# Patient Record
Sex: FEMALE | Race: OTHER | HISPANIC OR LATINO | URBAN - METROPOLITAN AREA
[De-identification: names, ages, dates, MRNs, and addresses within clinical notes are randomized per-mention and may not be internally consistent; named-entity substitution may affect disease eponyms.]

---

## 2020-11-25 ENCOUNTER — EMERGENCY (EMERGENCY)
Facility: HOSPITAL | Age: 25
LOS: 0 days | Discharge: HOME | End: 2020-11-25
Attending: EMERGENCY MEDICINE | Admitting: EMERGENCY MEDICINE
Payer: MEDICAID

## 2020-11-25 VITALS
DIASTOLIC BLOOD PRESSURE: 49 MMHG | WEIGHT: 233.91 LBS | HEART RATE: 82 BPM | SYSTOLIC BLOOD PRESSURE: 110 MMHG | TEMPERATURE: 98 F | RESPIRATION RATE: 16 BRPM | OXYGEN SATURATION: 98 %

## 2020-11-25 VITALS — HEIGHT: 67 IN | WEIGHT: 235.01 LBS

## 2020-11-25 DIAGNOSIS — F41.9 ANXIETY DISORDER, UNSPECIFIED: ICD-10-CM

## 2020-11-25 DIAGNOSIS — R00.2 PALPITATIONS: ICD-10-CM

## 2020-11-25 DIAGNOSIS — U07.1 COVID-19: ICD-10-CM

## 2020-11-25 PROCEDURE — 99284 EMERGENCY DEPT VISIT MOD MDM: CPT

## 2020-11-25 NOTE — ED PROVIDER NOTE - OBJECTIVE STATEMENT
this is 24yo female presents to ed for evaluation of anxiety. patient states she was diagnosed with covid 8 days ago. patient states that she has been in isolaction for past 8 days and misses her kids. patient had anxiety attack. patient also reports running out of her anxiety medicine. patient denies any chest pain or shortness of breath. patient states she only had body aches for one day

## 2020-11-25 NOTE — ED PROVIDER NOTE - NSFOLLOWUPINSTRUCTIONS_ED_ALL_ED_FT
Anxiety    Generalized anxiety disorder (NHI) is a mental disorder. It is defined as anxiety that is not necessarily related to specific events or activities or is out of proportion to said events. Symptoms include restlessness, fatigue, difficulty concentrations, irritability and difficulty concentrating. It may interfere with life functions, including relationships, work, and school. If you were started on a medication, make sure to take exactly as prescribed and follow up with a psychiatrist.    SEEK IMMEDIATE MEDICAL CARE IF YOU HAVE ANY OF THE FOLLOWING SYMPTOMS: thoughts about hurting killing yourself, thoughts about hurting or killing somebody else, hallucinations, or worsening depression.

## 2020-11-25 NOTE — ED PROVIDER NOTE - NS ED ROS FT
Review of Systems:  	•	CONSTITUTIONAL - no fever, no diaphoresis, no chills  	•	SKIN - no rash  	•	HEMATOLOGIC - no bleeding, no bruising  	•	EYES - no eye pain, no blurry vision  	•	ENT - no change in hearing, no sore throat, no ear pain or tinnitus  	•	RESPIRATORY - no shortness of breath, no cough  	•	CARDIAC - no chest pain, no palpitations  	•	GI - no abd pain, no nausea, no vomiting, no diarrhea, no constipation  	•	GENITO-URINARY - no discharge, no dysuria; no hematuria, no increased urinary frequency  	•	MUSCULOSKELETAL - no joint paint, no swelling, no redness  	•	NEUROLOGIC - no weakness, no headache, no paresthesias, no LOC  	•	PSYCH - positive anxiety, non suicidal, non homicidal, no hallucination, no depression

## 2020-11-25 NOTE — ED ADULT TRIAGE NOTE - CHIEF COMPLAINT QUOTE
Pt brought in by ambulance. "I have been having palpitations for two days." Pt is COVID positive and isolating at home. Pt states she ran out of her Valium two weeks ago.

## 2020-11-25 NOTE — ED PROVIDER NOTE - ATTENDING CONTRIBUTION TO CARE
25yF p/w palpitations - pt is COVID+ on day 8 of isolation (had mild URI sx) and reports 2d of near-continuous palpitations.  No CP or SOB.  No fever or cough.  Has been staying physically active in her bedroom.  No asymmetric leg swelling.  No prior hx palpitations, though pt w/ hx anxiety (intermittent xanax use), which she feels has worsened during isolation since she cannot interact with her family and children.

## 2020-11-25 NOTE — ED ADULT NURSE NOTE - IDEAL BODY WEIGHT(KG)
Vaginal discharge (presumed recurrent bacterial vaginosis):  - start oral antibiotic (clindamycin) - take 1 tablet twice a day with food x 7 days  - THEN resume metronidazole vaginal gel TWICE a week  - start an over the counter probiotic  - drink plenty o
60

## 2020-11-25 NOTE — ED PROVIDER NOTE - CLINICAL SUMMARY MEDICAL DECISION MAKING FREE TEXT BOX
25yF p/w palpitations, covid+ but well appearing, hemodynamically stable and w/o resp distress.  Suspect inc anxiety caused by social isolation contributing to her palpitations.  EKG w/o ischemia or arrhythmia.  Recommend supportive care, o/p f/u if not improving, return precautions.

## 2020-11-25 NOTE — ED PROVIDER NOTE - PATIENT PORTAL LINK FT
You can access the FollowMyHealth Patient Portal offered by [facility] by registering at the following website: [email or web address]. By joining [employer]’s FollowMyHealth portal, you will also be able to view your health information using other applications (apps) compatible with our system.

## 2021-07-22 ENCOUNTER — EMERGENCY (EMERGENCY)
Facility: HOSPITAL | Age: 26
LOS: 0 days | Discharge: HOME | End: 2021-07-22
Attending: EMERGENCY MEDICINE | Admitting: EMERGENCY MEDICINE
Payer: MEDICAID

## 2021-07-22 VITALS
OXYGEN SATURATION: 98 % | HEIGHT: 67 IN | DIASTOLIC BLOOD PRESSURE: 73 MMHG | RESPIRATION RATE: 18 BRPM | HEART RATE: 74 BPM | TEMPERATURE: 98 F | SYSTOLIC BLOOD PRESSURE: 130 MMHG | WEIGHT: 235.01 LBS

## 2021-07-22 DIAGNOSIS — R29.810 FACIAL WEAKNESS: ICD-10-CM

## 2021-07-22 DIAGNOSIS — R51.9 HEADACHE, UNSPECIFIED: ICD-10-CM

## 2021-07-22 DIAGNOSIS — Z86.59 PERSONAL HISTORY OF OTHER MENTAL AND BEHAVIORAL DISORDERS: ICD-10-CM

## 2021-07-22 DIAGNOSIS — G51.0 BELL'S PALSY: ICD-10-CM

## 2021-07-22 PROBLEM — F41.9 ANXIETY DISORDER, UNSPECIFIED: Chronic | Status: ACTIVE | Noted: 2020-11-25

## 2021-07-22 PROCEDURE — 99283 EMERGENCY DEPT VISIT LOW MDM: CPT

## 2021-07-22 RX ORDER — VALACYCLOVIR 500 MG/1
2 TABLET, FILM COATED ORAL
Qty: 42 | Refills: 0
Start: 2021-07-22 | End: 2021-07-28

## 2021-07-22 RX ORDER — DIAZEPAM 5 MG
0 TABLET ORAL
Qty: 0 | Refills: 0 | DISCHARGE

## 2021-07-22 NOTE — ED PROVIDER NOTE - PATIENT PORTAL LINK FT
You can access the FollowMyHealth Patient Portal offered by [facility] by registering at the following website: [email or web address]. By joining [employer]’s FollowMyHealth portal, you will also be able to view your health information using other applications (apps) compatible with our system.

## 2021-07-22 NOTE — ED PROVIDER NOTE - CLINICAL SUMMARY MEDICAL DECISION MAKING FREE TEXT BOX
Patient has a classic presentation of Winthrop Palsy. Other than CN7 involvement, patient has a completely normal neuro exam. Patient has had 2 days of symptoms and will start steroid and Valacyclovir. Patient is a good candidate to attempt outpatient management. Supportive care and home care discussed in detail. Patient aware they may have to return for re-evaluation and possible admission if outpatient treatment fails. Strict return precautions discussed.  I discussed with patient need for possible MRI for further work up for their symptoms and how this was not possible in the Emergency Department at this time.  Follow up being provided to have further evaluation for this test given. Will DC with Neuro follow up. Will also give eye drops and eye care discussed. Chances of full recovery vs chronic symptoms discussed. Patient aware she may benefit from Lyme testing.    Full DC instructions discussed and patient knows when to seek immediate medical attention.  Patient has proper follow up.  All results discussed and patient aware they may require further work up.  Proper follow up ensured. Limitations of ED work up discussed.  Medications administered and prescribed/OTC home meds discussed.  All questions and concerns from patient or family addressed. Understanding of instructions verbalized.

## 2021-07-22 NOTE — ED PROVIDER NOTE - ATTENDING CONTRIBUTION TO CARE
I personally evaluated patient. I agree with the findings and plan with all documentation on chart except as documented  in my note.    Patient has a classic presentation of Vul[place] Palsy. Other than CN7 involvement, patient has a completely normal neuro exam. Patient has had 2 days of symptoms and will start steroid and Valacyclovir. Patient is a good candidate to attempt outpatient management. Supportive care and home care discussed in detail. Patient aware they may have to return for re-evaluation and possible admission if outpatient treatment fails. Strict return precautions discussed.  I discussed with patient need for possible MRI for further work up for their symptoms and how this was not possible in the Emergency Department at this time.  Follow up being provided to have further evaluation for this test given. Will DC with Neuro follow up. Will also give eye drops and eye care discussed. Chances of full recovery vs chronic symptoms discussed. Patient aware she may benefit from Lyme testing.    Full DC instructions discussed and patient knows when to seek immediate medical attention.  Patient has proper follow up.  All results discussed and patient aware they may require further work up.  Proper follow up ensured. Limitations of ED work up discussed.  Medications administered and prescribed/OTC home meds discussed.  All questions and concerns from patient or family addressed. Understanding of instructions verbalized.

## 2021-07-22 NOTE — ED PROVIDER NOTE - PHYSICAL EXAMINATION
GENERAL: Well-nourished, Well-developed. NAD.  HEAD: No visible or palpable bumps or hematomas. No ecchymosis behind ears B/L.  Eyes: (+)inability to fully close R eye. PERRLA, EOMI. No asymmetry. No nystagmus. No conjunctival injection. Non-icteric sclera.  ENMT: MMM.  Neck: Supple. FROM  CVS: RRR. Normal S1,S2. No murmurs appreciated on auscultation   RESP: Lungs clear to auscultation B/L. No wheezing, rales, or rhonchi auscultated.  Skin: Warm, Dry. No rashes or lesions. Good cap refill < 2 sec B/L.  EXT: Radial and pedal pulses present B/L.   Neuro: AA&O x 3. (+)CN 7 palsy. remainder of CN intact. (+)R sided facial droop, no wrinkling of forehead on R side, unable to close R eye completely. Speaking in full sentences. No slurring of speech. No tremors. Sensation grossly intact. Strength 5/5 B/L. Gait within normal limits. Negative Romberg and Pronator Drift. Normal Finger to nose exam. Visual fields intact.

## 2021-07-22 NOTE — ED PROVIDER NOTE - CARE PROVIDERS DIRECT ADDRESSES
[email or web address]

## 2021-07-22 NOTE — ED PROVIDER NOTE - CARE PROVIDER_API CALL
Pee Fernandez  Infectious Diseases  [address] [place] [address]  Phone: [phone]  Fax: [phone]  Follow Up Time: 1-3 Days

## 2021-07-22 NOTE — ED PROVIDER NOTE - OBJECTIVE STATEMENT
25 yo F no pmhx presenting to the ED for evaluation of R sided facial droop, alteration in taste, difficulty closing R eye x 2 days. Pt reports symptoms have been persistent since yesterday without relief, took Excedrin for mild R sided HA yesterday that has resolved. LMP mid June. Denies recent illness. Denies any hx of Lyme disease or tick bite. Denies rash, fever, chills, numbness/tingling to extremities, weakness, nausea, vomiting, neck pain, chest pain, sob.

## 2021-07-22 NOTE — ED PROVIDER NOTE - NSFOLLOWUPINSTRUCTIONS_ED_ALL_ED_FT
**Follow up with your primary care doctor and infectious disease doctor within 1-3 days**    Bell's Palsy    Bell's palsy is a condition in which the muscles on one side of the face become paralyzed. This often causes one side of the face to droop. It is a common condition and most people recover completely. Causes include viral infections but most of the time the reason remains unknown. Signs and symptoms include not being able to raise your eyebrow, not being able to close your eye, drooping of the eyelid and corner of the mouth, sensitivity to loud noises, dryness of the eye, change in taste, and not being able to close your mouth/drooling. Take medicines only as directed by your health care provider. If your eye is affected use moisturizing eye drops to prevent drying of your eye and tape your eyelid shut at night as directed by your health care provider.    SEEK IMMEDIATE MEDICAL CARE IF YOU HAVE THE FOLLOWING SYMPTOMS: weakness or numbness in another part of your body, difficulty swallowing, fever, or neck pain.

## 2021-07-22 NOTE — ED PROVIDER NOTE - NS ED ROS FT
Constitutional: (-) fever (-) malaise (-) diaphoresis (-) chills  Eyes: (-) visual changes (-) eye pain (+)difficulty closing R eye  ENMT: (-) nasal or chest congestion (-) runny nose (-) sore throat (-) hoarseness  (-) hearing changes (-) ear pain (-) ear discharge or infections (-) neck pain (-) neck stiffness  Cardiac: (-) chest pain  (-) palpitations (-) syncope (-) edema  Respiratory: (-) cough (-) SOB (-) AYERS  GI: (-) nausea (-) vomiting (-) diarrhea (-) abdominal pain   : (-) dysuria (-) increased frequency   MS: (-)joint pain  Neuro: (+)R facial droop (-) headache (-) dizziness (-) numbness/tingling to extremities B/L (-) weakness (-) LOC (-) head injury (-) AMS (-) saddle anesthesia (-) tremors  Skin: (-) rash (-) laceration    Except as documented in the HPI, all other systems are negative.

## 2021-12-13 PROBLEM — Z00.00 ENCOUNTER FOR PREVENTIVE HEALTH EXAMINATION: Status: ACTIVE | Noted: 2021-12-13

## 2021-12-17 ENCOUNTER — APPOINTMENT (OUTPATIENT)
Dept: OBGYN | Facility: CLINIC | Age: 26
End: 2021-12-17

## 2024-01-06 ENCOUNTER — TRANSCRIPTION ENCOUNTER (OUTPATIENT)
Age: 29
End: 2024-01-06

## 2024-01-06 ENCOUNTER — EMERGENCY (EMERGENCY)
Facility: HOSPITAL | Age: 29
LOS: 0 days | Discharge: HOME | End: 2024-01-06
Attending: EMERGENCY MEDICINE
Payer: MEDICAID

## 2024-01-06 VITALS
WEIGHT: 253.97 LBS | HEART RATE: 65 BPM | RESPIRATION RATE: 18 BRPM | SYSTOLIC BLOOD PRESSURE: 125 MMHG | TEMPERATURE: 98 F | OXYGEN SATURATION: 100 % | DIASTOLIC BLOOD PRESSURE: 68 MMHG | HEIGHT: 66 IN

## 2024-01-06 DIAGNOSIS — R10.32 LEFT LOWER QUADRANT PAIN: ICD-10-CM

## 2024-01-06 DIAGNOSIS — N93.9 ABNORMAL UTERINE AND VAGINAL BLEEDING, UNSPECIFIED: ICD-10-CM

## 2024-01-06 DIAGNOSIS — Z90.49 ACQUIRED ABSENCE OF OTHER SPECIFIED PARTS OF DIGESTIVE TRACT: ICD-10-CM

## 2024-01-06 DIAGNOSIS — R14.0 ABDOMINAL DISTENSION (GASEOUS): ICD-10-CM

## 2024-01-06 LAB
APPEARANCE UR: ABNORMAL
APPEARANCE UR: ABNORMAL
BACTERIA # UR AUTO: ABNORMAL /HPF
BACTERIA # UR AUTO: ABNORMAL /HPF
BILIRUB UR-MCNC: NEGATIVE — SIGNIFICANT CHANGE UP
BILIRUB UR-MCNC: NEGATIVE — SIGNIFICANT CHANGE UP
CAST: 2 /LPF — SIGNIFICANT CHANGE UP (ref 0–4)
CAST: 2 /LPF — SIGNIFICANT CHANGE UP (ref 0–4)
COLOR SPEC: YELLOW — SIGNIFICANT CHANGE UP
COLOR SPEC: YELLOW — SIGNIFICANT CHANGE UP
DIFF PNL FLD: ABNORMAL
DIFF PNL FLD: ABNORMAL
GLUCOSE UR QL: NEGATIVE MG/DL — SIGNIFICANT CHANGE UP
GLUCOSE UR QL: NEGATIVE MG/DL — SIGNIFICANT CHANGE UP
KETONES UR-MCNC: NEGATIVE MG/DL — SIGNIFICANT CHANGE UP
KETONES UR-MCNC: NEGATIVE MG/DL — SIGNIFICANT CHANGE UP
LEUKOCYTE ESTERASE UR-ACNC: ABNORMAL
LEUKOCYTE ESTERASE UR-ACNC: ABNORMAL
NITRITE UR-MCNC: NEGATIVE — SIGNIFICANT CHANGE UP
NITRITE UR-MCNC: NEGATIVE — SIGNIFICANT CHANGE UP
PH UR: 5.5 — SIGNIFICANT CHANGE UP (ref 5–8)
PH UR: 5.5 — SIGNIFICANT CHANGE UP (ref 5–8)
PROT UR-MCNC: 100 MG/DL
PROT UR-MCNC: 100 MG/DL
RBC CASTS # UR COMP ASSIST: 3 /HPF — SIGNIFICANT CHANGE UP (ref 0–4)
RBC CASTS # UR COMP ASSIST: 3 /HPF — SIGNIFICANT CHANGE UP (ref 0–4)
SP GR SPEC: 1.03 — SIGNIFICANT CHANGE UP (ref 1–1.03)
SP GR SPEC: 1.03 — SIGNIFICANT CHANGE UP (ref 1–1.03)
SQUAMOUS # UR AUTO: 6 /HPF — HIGH (ref 0–5)
SQUAMOUS # UR AUTO: 6 /HPF — HIGH (ref 0–5)
UROBILINOGEN FLD QL: 1 MG/DL — SIGNIFICANT CHANGE UP (ref 0.2–1)
UROBILINOGEN FLD QL: 1 MG/DL — SIGNIFICANT CHANGE UP (ref 0.2–1)
WBC UR QL: 4 /HPF — SIGNIFICANT CHANGE UP (ref 0–5)
WBC UR QL: 4 /HPF — SIGNIFICANT CHANGE UP (ref 0–5)

## 2024-01-06 PROCEDURE — 76830 TRANSVAGINAL US NON-OB: CPT | Mod: 26

## 2024-01-06 PROCEDURE — 81001 URINALYSIS AUTO W/SCOPE: CPT

## 2024-01-06 PROCEDURE — 76830 TRANSVAGINAL US NON-OB: CPT

## 2024-01-06 PROCEDURE — 87086 URINE CULTURE/COLONY COUNT: CPT

## 2024-01-06 PROCEDURE — 99284 EMERGENCY DEPT VISIT MOD MDM: CPT | Mod: 25

## 2024-01-06 PROCEDURE — 99284 EMERGENCY DEPT VISIT MOD MDM: CPT

## 2024-01-06 NOTE — ED PROVIDER NOTE - OBJECTIVE STATEMENT
Patient 28-year-old female no significant past medical history presenting to ED for evaluation of 2 days of left lower quadrant abdominal pain associated with vaginal spotting that has been going on for the last week. Otherwise denies any fever, chills, headache, changes in vision, cough, congestion, cp, palpitations, sob, n/v/d, constipation, urinary complaints, lower extremity pain/swelling.

## 2024-01-06 NOTE — ED PROVIDER NOTE - PATIENT PORTAL LINK FT
You can access the FollowMyHealth Patient Portal offered by [facility] by registering at the following website: [email or web address]. By joining [employer]’s FollowMyHealth portal, you will also be able to view your health information using other applications (apps) compatible with our system. You can access the FollowMyHealth Patient Portal offered by [facility] by registering at the following website: [email or web address]. By joining [employer]’s FollowMyHealth portal, you will also be able to view your health information using other applications (apps) compatible with our system.

## 2024-01-06 NOTE — ED PROVIDER NOTE - NS_EDPROVIDERDISPOUSERTYPE_ED_A_ED
Bladder scan done on pt 499 ml noted on scan,will notify Dr. Gonsales.   Attending Attestation (For Attendings USE Only)...

## 2024-01-06 NOTE — ED ADULT NURSE NOTE - CHIEF COMPLAINT QUOTE
Pt c/o constant sharp pain in LLQ. Pt states that she felt "a balloon-like" feeling when she rolled on to her stomach.

## 2024-01-06 NOTE — ED PROVIDER NOTE - DIFFERENTIAL DIAGNOSIS
Differential Diagnosis uterine fibroids, pregnancy, ectopic pregnancy, rutured ovarian cyst uterine fibroids, pregnancy, ectopic pregnancy, ruptured ovarian cyst, UTI

## 2024-01-06 NOTE — ED PROVIDER NOTE - ATTENDING CONTRIBUTION TO CARE
28-year-old female no past medical history, status postcholecystectomy presents with lower abdominal pain worse on left side since last night feels bloated intermittent dull mild pain.  Patient has had 3 days of vaginal spotting.  LMP was August but states she is irregular, had Depo shot 3 months ago.  No active bleeding or discharge.  No fever.  No nausea vomiting diarrhea constipation.  No dysuria frequency hematuria.  She states pain is resolved now just feels bloated.    On exam, AFVSS, Well appearing, No acute distress, NCAT, EOMI, PERRLA, MMM, Neck supple, LCTAB, RRR nl s1s2 No mrg, Abdomen Soft NTND, CVA tenderness, AAOx3, No Focal Deficits, No LE edema or calf TTP,    A/P; abdominal pain, resolved nontender on exam, pelvic ultrasound unremarkable no cyst torsion, UA negative, urine pregnancy test negative, DC home follow-up with PMD/OB/GYN as outpatient strict return precautions

## 2024-01-06 NOTE — ED ADULT TRIAGE NOTE - CHIEF COMPLAINT QUOTE
Pt c/o constant llq pain x 2 days. Pt c/o constant LLQ pain x 2 days. Pt states that she felt "a balloon-like" feeling when she rolled on to her stomach.

## 2024-01-08 LAB
CULTURE RESULTS: SIGNIFICANT CHANGE UP
CULTURE RESULTS: SIGNIFICANT CHANGE UP
SPECIMEN SOURCE: SIGNIFICANT CHANGE UP
SPECIMEN SOURCE: SIGNIFICANT CHANGE UP

## 2024-02-09 ENCOUNTER — APPOINTMENT (OUTPATIENT)
Dept: OBGYN | Facility: CLINIC | Age: 29
End: 2024-02-09

## 2024-04-16 ENCOUNTER — EMERGENCY (EMERGENCY)
Facility: HOSPITAL | Age: 29
LOS: 0 days | Discharge: HOME | End: 2024-04-16
Attending: EMERGENCY MEDICINE
Payer: MEDICAID

## 2024-04-16 VITALS
HEART RATE: 79 BPM | DIASTOLIC BLOOD PRESSURE: 58 MMHG | RESPIRATION RATE: 18 BRPM | TEMPERATURE: 98 F | OXYGEN SATURATION: 98 % | SYSTOLIC BLOOD PRESSURE: 131 MMHG

## 2024-04-16 DIAGNOSIS — R20.2 PARESTHESIA OF SKIN: ICD-10-CM

## 2024-04-16 DIAGNOSIS — R06.02 SHORTNESS OF BREATH: ICD-10-CM

## 2024-04-16 DIAGNOSIS — F41.9 ANXIETY DISORDER, UNSPECIFIED: ICD-10-CM

## 2024-04-16 DIAGNOSIS — R07.89 OTHER CHEST PAIN: ICD-10-CM

## 2024-04-16 LAB
ALBUMIN SERPL ELPH-MCNC: 4.2 G/DL — SIGNIFICANT CHANGE UP (ref 3.5–5.2)
ALP SERPL-CCNC: 84 U/L — SIGNIFICANT CHANGE UP (ref 30–115)
ALT FLD-CCNC: 29 U/L — SIGNIFICANT CHANGE UP (ref 0–41)
ANION GAP SERPL CALC-SCNC: 14 MMOL/L — SIGNIFICANT CHANGE UP (ref 7–14)
AST SERPL-CCNC: 24 U/L — SIGNIFICANT CHANGE UP (ref 0–41)
BASOPHILS # BLD AUTO: 0.02 K/UL — SIGNIFICANT CHANGE UP (ref 0–0.2)
BASOPHILS NFR BLD AUTO: 0.3 % — SIGNIFICANT CHANGE UP (ref 0–1)
BILIRUB SERPL-MCNC: 0.3 MG/DL — SIGNIFICANT CHANGE UP (ref 0.2–1.2)
BUN SERPL-MCNC: 10 MG/DL — SIGNIFICANT CHANGE UP (ref 10–20)
CALCIUM SERPL-MCNC: 9.2 MG/DL — SIGNIFICANT CHANGE UP (ref 8.4–10.4)
CHLORIDE SERPL-SCNC: 107 MMOL/L — SIGNIFICANT CHANGE UP (ref 98–110)
CO2 SERPL-SCNC: 21 MMOL/L — SIGNIFICANT CHANGE UP (ref 17–32)
CREAT SERPL-MCNC: 0.5 MG/DL — LOW (ref 0.7–1.5)
EGFR: 129 ML/MIN/1.73M2 — SIGNIFICANT CHANGE UP
EOSINOPHIL # BLD AUTO: 0.13 K/UL — SIGNIFICANT CHANGE UP (ref 0–0.7)
EOSINOPHIL NFR BLD AUTO: 1.6 % — SIGNIFICANT CHANGE UP (ref 0–8)
GLUCOSE SERPL-MCNC: 82 MG/DL — SIGNIFICANT CHANGE UP (ref 70–99)
HCG SERPL QL: NEGATIVE — SIGNIFICANT CHANGE UP
HCT VFR BLD CALC: 37.4 % — SIGNIFICANT CHANGE UP (ref 37–47)
HGB BLD-MCNC: 11.9 G/DL — LOW (ref 12–16)
IMM GRANULOCYTES NFR BLD AUTO: 0.4 % — HIGH (ref 0.1–0.3)
LYMPHOCYTES # BLD AUTO: 2.99 K/UL — SIGNIFICANT CHANGE UP (ref 1.2–3.4)
LYMPHOCYTES # BLD AUTO: 37.8 % — SIGNIFICANT CHANGE UP (ref 20.5–51.1)
MCHC RBC-ENTMCNC: 25.9 PG — LOW (ref 27–31)
MCHC RBC-ENTMCNC: 31.8 G/DL — LOW (ref 32–37)
MCV RBC AUTO: 81.5 FL — SIGNIFICANT CHANGE UP (ref 81–99)
MONOCYTES # BLD AUTO: 0.61 K/UL — HIGH (ref 0.1–0.6)
MONOCYTES NFR BLD AUTO: 7.7 % — SIGNIFICANT CHANGE UP (ref 1.7–9.3)
NEUTROPHILS # BLD AUTO: 4.14 K/UL — SIGNIFICANT CHANGE UP (ref 1.4–6.5)
NEUTROPHILS NFR BLD AUTO: 52.2 % — SIGNIFICANT CHANGE UP (ref 42.2–75.2)
NRBC # BLD: 0 /100 WBCS — SIGNIFICANT CHANGE UP (ref 0–0)
PLATELET # BLD AUTO: 299 K/UL — SIGNIFICANT CHANGE UP (ref 130–400)
PMV BLD: 9.8 FL — SIGNIFICANT CHANGE UP (ref 7.4–10.4)
POTASSIUM SERPL-MCNC: 4.7 MMOL/L — SIGNIFICANT CHANGE UP (ref 3.5–5)
POTASSIUM SERPL-SCNC: 4.7 MMOL/L — SIGNIFICANT CHANGE UP (ref 3.5–5)
PROT SERPL-MCNC: 7.4 G/DL — SIGNIFICANT CHANGE UP (ref 6–8)
RBC # BLD: 4.59 M/UL — SIGNIFICANT CHANGE UP (ref 4.2–5.4)
RBC # FLD: 14 % — SIGNIFICANT CHANGE UP (ref 11.5–14.5)
SODIUM SERPL-SCNC: 142 MMOL/L — SIGNIFICANT CHANGE UP (ref 135–146)
WBC # BLD: 7.92 K/UL — SIGNIFICANT CHANGE UP (ref 4.8–10.8)
WBC # FLD AUTO: 7.92 K/UL — SIGNIFICANT CHANGE UP (ref 4.8–10.8)

## 2024-04-16 PROCEDURE — 93010 ELECTROCARDIOGRAM REPORT: CPT

## 2024-04-16 PROCEDURE — 99284 EMERGENCY DEPT VISIT MOD MDM: CPT

## 2024-04-16 PROCEDURE — 84703 CHORIONIC GONADOTROPIN ASSAY: CPT

## 2024-04-16 PROCEDURE — 71046 X-RAY EXAM CHEST 2 VIEWS: CPT

## 2024-04-16 PROCEDURE — 71046 X-RAY EXAM CHEST 2 VIEWS: CPT | Mod: 26

## 2024-04-16 PROCEDURE — 93308 TTE F-UP OR LMTD: CPT

## 2024-04-16 PROCEDURE — 80053 COMPREHEN METABOLIC PANEL: CPT

## 2024-04-16 PROCEDURE — 93005 ELECTROCARDIOGRAM TRACING: CPT

## 2024-04-16 PROCEDURE — 99285 EMERGENCY DEPT VISIT HI MDM: CPT | Mod: 25

## 2024-04-16 PROCEDURE — 36415 COLL VENOUS BLD VENIPUNCTURE: CPT

## 2024-04-16 PROCEDURE — 85025 COMPLETE CBC W/AUTO DIFF WBC: CPT

## 2024-04-16 NOTE — ED PROVIDER NOTE - OBJECTIVE STATEMENT
Patient is a 29-year-old female past medical history of anxiety presenting for evaluation of chest tightness, tingling in the hands and legs, and feeling that she cannot breathe for the last 2 days.  Patient denies any fevers, chills, sick contacts, congestion, recent URI, recent travel, prolonged travel, birth control use, smoking history, leg swelling, chest pain.

## 2024-04-16 NOTE — ED PROVIDER NOTE - ATTENDING CONTRIBUTION TO CARE
29-year-old female to ED with weakness chest tightness tingling in arms and legs.  No injury or fall otherwise well-appearing nontoxic.  Symptoms much improved now in the ED.  No longer having chest pain or shortness of breath.Afebrile vital signs stable exam as noted clear lungs bilaterally conjunctiva pink HEENT normal, regular rate and rhythm abdomen soft nontender and neuro nonfocal.

## 2024-04-16 NOTE — ED PROVIDER NOTE - PHYSICAL EXAMINATION
VITAL SIGNS: I have reviewed nursing notes and confirm.  CONSTITUTIONAL: Anxious appearing, non-toxic, NAD  SKIN: Warm dry, normal skin turgor  HEAD: NCAT  EYES: EOMI, PERRLA, no scleral icterus  ENT: Moist mucous membranes, normal pharynx with no erythema or exudates  NECK: Supple; non tender. Full ROM. No cervical LAD  CARD: RRR, no murmurs, rubs or gallops  RESP: clear to ausculation b/l.  No rales, rhonchi, or wheezing.  ABD: soft, + BS, non-tender, non-distended, no rebound or guarding.   EXT: Full ROM, no pedal edema, no calf tenderness  NEURO: normal motor. normal sensory. CN II-XII intact. Normal gait.  PSYCH: Cooperative, appropriate.

## 2024-04-16 NOTE — ED ADULT NURSE NOTE - NSFALLHARMRISKINTERV_ED_ALL_ED

## 2024-04-16 NOTE — ED PROVIDER NOTE - PATIENT PORTAL LINK FT
You can access the FollowMyHealth Patient Portal offered by [facility] by registering at the following website: [email or web address]. By joining [employer]’s FollowMyHealth portal, you will also be able to view your health information using other applications (apps) compatible with our system.

## 2024-05-16 ENCOUNTER — NON-APPOINTMENT (OUTPATIENT)
Age: 29
End: 2024-05-16

## 2024-05-21 ENCOUNTER — OUTPATIENT (OUTPATIENT)
Dept: OUTPATIENT SERVICES | Facility: HOSPITAL | Age: 29
LOS: 1 days | End: 2024-05-21
Payer: SELF-PAY

## 2024-05-21 ENCOUNTER — APPOINTMENT (OUTPATIENT)
Dept: CARDIOLOGY | Facility: CLINIC | Age: 29
End: 2024-05-21
Payer: SELF-PAY

## 2024-05-21 VITALS
WEIGHT: 244 LBS | HEART RATE: 78 BPM | OXYGEN SATURATION: 99 % | DIASTOLIC BLOOD PRESSURE: 68 MMHG | HEIGHT: 66 IN | TEMPERATURE: 97.6 F | SYSTOLIC BLOOD PRESSURE: 117 MMHG | BODY MASS INDEX: 39.21 KG/M2

## 2024-05-21 DIAGNOSIS — R94.31 ABNORMAL ELECTROCARDIOGRAM [ECG] [EKG]: ICD-10-CM

## 2024-05-21 DIAGNOSIS — Z00.00 ENCOUNTER FOR GENERAL ADULT MEDICAL EXAMINATION WITHOUT ABNORMAL FINDINGS: ICD-10-CM

## 2024-05-21 PROCEDURE — 99204 OFFICE O/P NEW MOD 45 MIN: CPT

## 2024-05-21 PROCEDURE — 84439 ASSAY OF FREE THYROXINE: CPT

## 2024-05-21 PROCEDURE — 84443 ASSAY THYROID STIM HORMONE: CPT

## 2024-05-22 LAB
T4 FREE SERPL-MCNC: 1.6 NG/DL
TSH SERPL-ACNC: 1.58 UIU/ML

## 2024-05-23 NOTE — HISTORY OF PRESENT ILLNESS
[FreeTextEntry1] : 28 yo F w anxiety disorder presents as referral from ED for bradycardia.  She presented to ED with c/o chest tightness and SOB which were similar to her panic attacks.  She otherwise c/o lightheadedness and vertigo for about 2-3 years, occurs 1-2 times a month, non exertional. Chest tightness is described as cold sensation in chest , not related to exertion.   Non smoker, drinks alcohol occasionally.

## 2024-05-23 NOTE — HISTORY OF PRESENT ILLNESS
[FreeTextEntry1] : 30 yo F w anxiety disorder presents as referral from ED for bradycardia.  She presented to ED with c/o chest tightness and SOB which were similar to her panic attacks.  She otherwise c/o lightheadedness and vertigo for about 2-3 years, occurs 1-2 times a month, non exertional. Chest tightness is described as cold sensation in chest , not related to exertion.   Non smoker, drinks alcohol occasionally.

## 2024-05-23 NOTE — ASSESSMENT
[FreeTextEntry1] : 30 yo F w anxiety disorder presents as referral from ED for bradycardia.   EKG-sinus bradycardia @ 50 bpm   Impression:  Likely asymptomatic sinus bradycardia   Plan-  -check TFTs -7 day MCOT for symptom correlation and to rule out chronotropic incompetence

## 2024-05-28 ENCOUNTER — NON-APPOINTMENT (OUTPATIENT)
Age: 29
End: 2024-05-28

## 2024-05-30 DIAGNOSIS — R94.31 ABNORMAL ELECTROCARDIOGRAM [ECG] [EKG]: ICD-10-CM

## 2024-06-11 ENCOUNTER — NON-APPOINTMENT (OUTPATIENT)
Age: 29
End: 2024-06-11

## 2024-06-11 PROCEDURE — 93244 EXT ECG>48HR<7D REV&INTERPJ: CPT

## 2025-02-12 ENCOUNTER — EMERGENCY (EMERGENCY)
Facility: HOSPITAL | Age: 30
LOS: 0 days | Discharge: HOME | End: 2025-02-12
Attending: EMERGENCY MEDICINE
Payer: MEDICAID

## 2025-02-12 VITALS
SYSTOLIC BLOOD PRESSURE: 120 MMHG | OXYGEN SATURATION: 98 % | HEART RATE: 75 BPM | RESPIRATION RATE: 18 BRPM | DIASTOLIC BLOOD PRESSURE: 74 MMHG | TEMPERATURE: 100 F

## 2025-02-12 VITALS
DIASTOLIC BLOOD PRESSURE: 75 MMHG | HEART RATE: 58 BPM | RESPIRATION RATE: 19 BRPM | OXYGEN SATURATION: 100 % | SYSTOLIC BLOOD PRESSURE: 123 MMHG | TEMPERATURE: 98 F

## 2025-02-12 DIAGNOSIS — R42 DIZZINESS AND GIDDINESS: ICD-10-CM

## 2025-02-12 DIAGNOSIS — R11.0 NAUSEA: ICD-10-CM

## 2025-02-12 DIAGNOSIS — R53.1 WEAKNESS: ICD-10-CM

## 2025-02-12 DIAGNOSIS — R52 PAIN, UNSPECIFIED: ICD-10-CM

## 2025-02-12 LAB
ALBUMIN SERPL ELPH-MCNC: 2.9 G/DL — LOW (ref 3.5–5.2)
ALP SERPL-CCNC: 60 U/L — SIGNIFICANT CHANGE UP (ref 30–115)
ALT FLD-CCNC: 26 U/L — SIGNIFICANT CHANGE UP (ref 0–41)
ANION GAP SERPL CALC-SCNC: 11 MMOL/L — SIGNIFICANT CHANGE UP (ref 7–14)
AST SERPL-CCNC: 29 U/L — SIGNIFICANT CHANGE UP (ref 0–41)
BASOPHILS # BLD AUTO: 0.01 K/UL — SIGNIFICANT CHANGE UP (ref 0–0.2)
BASOPHILS NFR BLD AUTO: 0.1 % — SIGNIFICANT CHANGE UP (ref 0–1)
BILIRUB SERPL-MCNC: 0.3 MG/DL — SIGNIFICANT CHANGE UP (ref 0.2–1.2)
BUN SERPL-MCNC: 8 MG/DL — LOW (ref 10–20)
CALCIUM SERPL-MCNC: 6.3 MG/DL — LOW (ref 8.4–10.4)
CHLORIDE SERPL-SCNC: 112 MMOL/L — HIGH (ref 98–110)
CO2 SERPL-SCNC: 17 MMOL/L — SIGNIFICANT CHANGE UP (ref 17–32)
CREAT SERPL-MCNC: <0.5 MG/DL — LOW (ref 0.7–1.5)
EGFR: 135 ML/MIN/1.73M2 — SIGNIFICANT CHANGE UP
EOSINOPHIL # BLD AUTO: 0.01 K/UL — SIGNIFICANT CHANGE UP (ref 0–0.7)
EOSINOPHIL NFR BLD AUTO: 0.1 % — SIGNIFICANT CHANGE UP (ref 0–8)
FLUAV AG NPH QL: SIGNIFICANT CHANGE UP
FLUBV AG NPH QL: DETECTED
GLUCOSE SERPL-MCNC: 98 MG/DL — SIGNIFICANT CHANGE UP (ref 70–99)
HCG SERPL QL: NEGATIVE — SIGNIFICANT CHANGE UP
HCT VFR BLD CALC: 41.6 % — SIGNIFICANT CHANGE UP (ref 37–47)
HGB BLD-MCNC: 13.7 G/DL — SIGNIFICANT CHANGE UP (ref 12–16)
IMM GRANULOCYTES NFR BLD AUTO: 0.2 % — SIGNIFICANT CHANGE UP (ref 0.1–0.3)
LYMPHOCYTES # BLD AUTO: 1.53 K/UL — SIGNIFICANT CHANGE UP (ref 1.2–3.4)
LYMPHOCYTES # BLD AUTO: 18.3 % — LOW (ref 20.5–51.1)
MCHC RBC-ENTMCNC: 26.1 PG — LOW (ref 27–31)
MCHC RBC-ENTMCNC: 32.9 G/DL — SIGNIFICANT CHANGE UP (ref 32–37)
MCV RBC AUTO: 79.2 FL — LOW (ref 81–99)
MONOCYTES # BLD AUTO: 0.22 K/UL — SIGNIFICANT CHANGE UP (ref 0.1–0.6)
MONOCYTES NFR BLD AUTO: 2.6 % — SIGNIFICANT CHANGE UP (ref 1.7–9.3)
NEUTROPHILS # BLD AUTO: 6.55 K/UL — HIGH (ref 1.4–6.5)
NEUTROPHILS NFR BLD AUTO: 78.7 % — HIGH (ref 42.2–75.2)
NRBC BLD AUTO-RTO: 0 /100 WBCS — SIGNIFICANT CHANGE UP (ref 0–0)
PLATELET # BLD AUTO: 295 K/UL — SIGNIFICANT CHANGE UP (ref 130–400)
PMV BLD: 10.1 FL — SIGNIFICANT CHANGE UP (ref 7.4–10.4)
POTASSIUM SERPL-MCNC: 3.9 MMOL/L — SIGNIFICANT CHANGE UP (ref 3.5–5)
POTASSIUM SERPL-SCNC: 3.9 MMOL/L — SIGNIFICANT CHANGE UP (ref 3.5–5)
PROT SERPL-MCNC: 5.3 G/DL — LOW (ref 6–8)
RBC # BLD: 5.25 M/UL — SIGNIFICANT CHANGE UP (ref 4.2–5.4)
RBC # FLD: 13.4 % — SIGNIFICANT CHANGE UP (ref 11.5–14.5)
RSV RNA NPH QL NAA+NON-PROBE: SIGNIFICANT CHANGE UP
SARS-COV-2 RNA SPEC QL NAA+PROBE: SIGNIFICANT CHANGE UP
SODIUM SERPL-SCNC: 140 MMOL/L — SIGNIFICANT CHANGE UP (ref 135–146)
TROPONIN T, HIGH SENSITIVITY RESULT: <6 NG/L — SIGNIFICANT CHANGE UP (ref 6–13)
WBC # BLD: 8.34 K/UL — SIGNIFICANT CHANGE UP (ref 4.8–10.8)
WBC # FLD AUTO: 8.34 K/UL — SIGNIFICANT CHANGE UP (ref 4.8–10.8)

## 2025-02-12 PROCEDURE — 99285 EMERGENCY DEPT VISIT HI MDM: CPT

## 2025-02-12 PROCEDURE — 0241U: CPT

## 2025-02-12 PROCEDURE — 36415 COLL VENOUS BLD VENIPUNCTURE: CPT

## 2025-02-12 PROCEDURE — 80053 COMPREHEN METABOLIC PANEL: CPT

## 2025-02-12 PROCEDURE — 85025 COMPLETE CBC W/AUTO DIFF WBC: CPT

## 2025-02-12 PROCEDURE — 93005 ELECTROCARDIOGRAM TRACING: CPT

## 2025-02-12 PROCEDURE — 84484 ASSAY OF TROPONIN QUANT: CPT

## 2025-02-12 PROCEDURE — 84703 CHORIONIC GONADOTROPIN ASSAY: CPT

## 2025-02-12 PROCEDURE — 71046 X-RAY EXAM CHEST 2 VIEWS: CPT | Mod: 26

## 2025-02-12 PROCEDURE — 99285 EMERGENCY DEPT VISIT HI MDM: CPT | Mod: 25

## 2025-02-12 PROCEDURE — 36000 PLACE NEEDLE IN VEIN: CPT

## 2025-02-12 PROCEDURE — 81025 URINE PREGNANCY TEST: CPT

## 2025-02-12 PROCEDURE — 71046 X-RAY EXAM CHEST 2 VIEWS: CPT

## 2025-02-12 PROCEDURE — 93010 ELECTROCARDIOGRAM REPORT: CPT

## 2025-02-12 RX ORDER — BACTERIOSTATIC SODIUM CHLORIDE 0.9 %
1000 VIAL (ML) INJECTION ONCE
Refills: 0 | Status: COMPLETED | OUTPATIENT
Start: 2025-02-12 | End: 2025-02-12

## 2025-02-12 RX ADMIN — Medication 1000 MILLILITER(S): at 13:54

## 2025-02-12 NOTE — ED ADULT TRIAGE NOTE - CHIEF COMPLAINT QUOTE
BIBA, as per EMS, pt began to "have an anxiety attack" while driving on her way to the city. has a history of anxiety, but does not take any medications.

## 2025-02-12 NOTE — ED PROVIDER NOTE - WHICH SHOWED
ED CXR film, my independent interpretation - Dr. Mona Baez, attending physician: No significant cardiopulmonary abnormalities, no ptx, no rib fractures     ED EKG: my independent interpretation - Dr. Mona Baez, attending physician : 60  nsr, qtc 396 , no stemi, no significant heart block

## 2025-02-12 NOTE — ED PROVIDER NOTE - OBJECTIVE STATEMENT
Patient is a 30-year-old female [facility] anxiety coming to ED for clamminess and lightheadedness.  Patient reports waking in normal health, while driving into the city began to feel clammy, shaky, lightheaded, generalized bodyaches.  Symptoms have been constant, no exacerbating relieving factors.  Patient reports having anxiety/panic attacks in the past that does not feel similar to this.  Denies fever, sick contacts, headache, visual changes, neck pain, chest pain, shortness of breath, abdominal pain, nausea vomiting diarrhea constipation, urinary symptoms, fall/trauma

## 2025-02-12 NOTE — ED PROVIDER NOTE - PATIENT PORTAL LINK FT
You can access the FollowMyHealth Patient Portal offered by [facility] by registering at the following website: [email or web address]. By joining [employer]’s FollowMyHealth portal, you will also be able to view your health information using other applications (apps) compatible with our system.

## 2025-02-12 NOTE — ED PROVIDER NOTE - CLINICAL SUMMARY MEDICAL DECISION MAKING FREE TEXT BOX
Pt with episode of dizziness with nausea, feeling improved with symptomatic tx.  labs, ekg and cxr reassuring.  viral swab results pending.  pt to f/u with pmd as needed.  Any ordered labs and EKG were reviewed, Dr. Mona Baez, attending physician.  Any imaging was ordered and reviewed by me, Dr. Mona Baez, attending physician.  Appropriate medications for patient's presenting complaints were ordered and effects were reassessed.  Patient's records, if available,  (prior hospital, ED visit, and/or nursing home notes if available) were reviewed.  Additional history was obtained from EMS, family, and/or PCP (when available).  Escalation to admission/observation was considered.  1) However patient feels much better and is comfortable with discharge.  Appropriate follow-up was arranged.

## 2025-02-12 NOTE — ED PROVIDER NOTE - PHYSICAL EXAMINATION
CONST: in NAD  EYES: EOMI, Sclera and conjunctiva clear.   ENT: No nasal discharge. Oropharynx normal appearing, no erythema or exudates. Uvula midline.  NECK: Non-tender  CARD: Normal S1 S2; Normal rate and rhythm  RESP: Equal BS B/L, No wheezes, rhonchi or rales. No distress  GI: Soft, non-tender, non-distended.  MS: Normal ROM in all extremities. No midline spinal tenderness.  SKIN: Warm, no acute rashes. Good turgor. clammy skin  NEURO: A&Ox3, No focal deficits. Strength 5/5 with no sensory deficits. Steady gait

## 2025-02-12 NOTE — ED PROVIDER NOTE - NSFOLLOWUPINSTRUCTIONS_ED_ALL_ED_FT
FOLLOW UP WITH YOUR PRIMARY DOCTOR  RETURN TO ED FOR NEW OR WORSENING SYMPTOMS    Weakness  Weakness is a lack of strength. You may feel weak all over your body (generalized), or you may feel weak in one part of your body (focal). Common causes of weakness include:  Infection and disorders of the body's defense system (immune system).  Physical exhaustion.  Internal bleeding or other blood loss that results in a lack of red blood cells (anemia).  Dehydration.  An imbalance in mineral (electrolyte) levels, such as potassium.  Chronic kidney or liver disease.  Cancer.  Other causes include:  Some medicines or cancer treatment.  Stress, anxiety, or depression.  Heart disease, circulation problems, or stroke.  Nervous system disorders.  Thyroid disorders.  Loss of muscle strength because of age or inactivity.  Poor sleep quality or sleep disorders.  The cause of your weakness may not be known. Some causes of weakness can be serious, so it is important to see your health care provider.    Follow these instructions at home:  Activity    Rest as needed.  Try to get enough sleep. Most adults need 7–8 hours of quality sleep each night. Talk to your health care provider about how much sleep you need.  Do exercises, such as arm curls and leg raises, for 30 minutes at least 2 days a week or as told by your health care provider. This helps build muscle strength.  Consider working with a physical therapist or  who can develop an exercise plan to help you gain muscle strength.  General instructions    A plate with examples of foods in a healthy diet.  Take over-the-counter and prescription medicines only as told by your health care provider.  Eat a healthy, well-balanced diet. This includes:  Proteins to build muscles, such as lean meats and fish.  Fresh fruits and vegetables.  Carbohydrates to boost energy, such as whole grains.  Drink enough fluid to keep your urine pale yellow.  Keep all follow-up visits. This is important.  Contact a health care provider if:  Your weakness does not improve or gets worse.  Your weakness affects your ability to think clearly.  Your weakness affects your ability to do your normal daily activities.  Get help right away if:  You develop sudden weakness, especially on one side of your face or body.  You have chest pain.  You have trouble breathing or shortness of breath.  You have problems with your vision.  You have trouble talking or swallowing.  You have trouble standing or walking.  You are light-headed or lose consciousness.  These symptoms may be an emergency. Get help right away. Call 911.  Do not wait to see if the symptoms will go away.  Do not drive yourself to the hospital.  Summary  Weakness is a lack of strength. You may feel weak all over your body or just in one specific part of your body.  Weakness can be caused by a variety of things. In some cases, the cause may be unknown.  Rest as needed, and try to get enough sleep. Most adults need 7–8 hours of quality sleep each night.  Eat a healthy, well-balanced diet.  This information is not intended to replace advice given to you by your health care provider. Make sure you discuss any questions you have with your health care provider.

## 2025-02-12 NOTE — ED PROVIDER NOTE - ATTENDING APP SHARED VISIT CONTRIBUTION OF CARE
30-year-old female with past medical history of anxiety, on no medications, presents with complaint of lightheadedness, nausea, feeling unwell.  Patient says started while her friend was [place] the bridge.  Patient denies any chest pain or shortness of breath, no palpitations.  Patient feels like she is can to vomit, but no abdominal pain.  Patient admits last week her son had a viral infection.  Patient is currently on her menses.  Patient says she is irregular, and prior to this week her last menses was 2 months ago.  Exam: nad, ncat, perrl, eomi, dry mm, rrr, ctab, abd soft, nt, nd aox3, no calf tenderness, no pitting edema, clammy impression: Patient with lightheadedness, nausea, will check labs, EKG, fingerstick, swab and urine.